# Patient Record
(demographics unavailable — no encounter records)

---

## 2024-10-28 NOTE — HEALTH RISK ASSESSMENT
[Good] : ~his/her~  mood as  good [Yes] : Yes [Monthly or less (1 pt)] : Monthly or less (1 point) [No falls in past year] : Patient reported no falls in the past year [0] : 2) Feeling down, depressed, or hopeless: Not at all (0) [PHQ-2 Negative - No further assessment needed] : PHQ-2 Negative - No further assessment needed [HIV test declined] : HIV test declined [Hepatitis C test declined] : Hepatitis C test declined [Fully functional (bathing, dressing, toileting, transferring, walking, feeding)] : Fully functional (bathing, dressing, toileting, transferring, walking, feeding) [Fully functional (using the telephone, shopping, preparing meals, housekeeping, doing laundry, using] : Fully functional and needs no help or supervision to perform IADLs (using the telephone, shopping, preparing meals, housekeeping, doing laundry, using transportation, managing medications and managing finances) [With Patient/Caregiver] : , with patient/caregiver [Never] : Never [[id number]] : 0 [MammogramDate] : never [MammogramComments] : ref [AdvancecareDate] : 10/28/2024

## 2024-10-28 NOTE — HISTORY OF PRESENT ILLNESS
[FreeTextEntry1] : HERE FOR Comprehensive annual examination  [de-identified] : diet- healthy exercise- running- 1 hr 2-3 times/ wk.  Has been in good overall health since his last CPE.

## 2024-10-28 NOTE — ASSESSMENT
[FreeTextEntry1] : While JAIDEN was in my office today, I administered a flu vaccine to her. I wanted you to be aware in order to update your records.  MEDICATION RECONCILIATION DONE *Immunizations COVID -19 TOTAL VACCINE 3    Influenza  10/28/2024 Pneumococcal - N/A TDAP -2020 GARDASIL :  3 /3 shingles vaccine N/A EKG done for HCM at this office today;  NSR no acute ST-T changes Lab ( venipuncture ) done today at this office Preventive medicine discussed - including importance of lifestyle modification - with incorporation of healthy diet,  recommended Diet low fat diet Depression screening WITH the Patient Health Questionnaire-2 (PHQ-2), THE RESULT WAS NEGATIVE. The benefits of adequate calcium intake and routine daily cardiovascular exercise were reviewed with the patient.  The patient was informed regarding the benefits of a YEARLY screening FOR SKIN CANCER -Recommended following up with dermatology for annual skin surveillance. -Recommended following up with GYN for annual surveillance. Recommended following up with dental, opthalmology  The importance of safer-sex was discussed with the patient. DISCUSSED PRECAUTIONS AGAINST COVID19, INCLUDING MASK WEARING, SOCIAL DISTANCING AND HAND WASHING. -Follow up in 1 year for CPE / annual exam or PRN. All questions and concerns were discussed.

## 2025-03-13 NOTE — PHYSICAL EXAM
[de-identified] : Constitutional:  37 year old female, alert and oriented, cooperative, in no acute distress.  HEENT  NC/AT.  Appearance: symmetric  Neck/Back Straight without deformity or instability.  Good ROM.  Chest/Respiratory  Respiratory effort: no intercostal retractions or use of accessory muscles. Nonlabored Breathing  Skin  On inspection, warm and dry without rashes or lesions.  Mental Status:  Judgment, insight: intact Orientation: oriented to time, place, and person  Neurological: Sensory and Motor are grossly intact throughout  Left Knee  Inspection:     Skin intact, no rashes or lesions     No Effusion     Non-tender to palpation over tibial tubercle, patella, medial and lateral joint line, and pes insertion.     Mild tenderness over the lateral hamstring insertion  Range of Motion: 	Extension - 0 degrees 	Flexion - 120 degrees 	Extensor lag: None  Stability:      Demonstrates no Varus or Valgus instability      Negative Anterior or Posterior drawer.      Negative Lachman's  Patella: stable, tracks well.   Neurologic Exam     Motor intact including 5/5 Extensor Hallucis Longus, 5/5 Flexor Hallucis Longus, 5/5 Tibialis Anterior and 5/5 Gastrocnemius     Sensation Intact to Light Touch including Saphenous, Sural, Superficial Peroneal, Deep Peroneal, Tibial nerve distributions  Vascular Exam     Foot is warm and well perfused with 2+ Dorsalis Pedis Pulse   No pain with range of motion of the bilateral hips or right knee. No lumbar paraspinal muscle tenderness. [de-identified] : XRay:  XRays of the Left Knee (4 Views) taken in the office today and discussed with the patient. XRays demonstrate no obvious fracture or dislocation. There is no significant evidence of osteoarthritis or osteophyte formation. (my personal interpretation).

## 2025-03-13 NOTE — DISCUSSION/SUMMARY
[de-identified] : Kati Phillip is a 37-year-old female who presents to the office for evaluation of her left knee pain.  X-rays showed no obvious fractures or dislocations.  Examination showed good left knee range of motion.  There was some tenderness over the hamstring tendon.  Discussed with the patient the examination and imaging findings.  Discussed with patient that her pain is possibly secondary to hamstring tendinitis.  Discussed with patient the management of her pain at this time, including home exercises, rest, ice, and elevation.  Patient will rest, ice, and elevate the knee.  Discussed home exercises and stretching.  Patient will take over-the-counter anti-inflammatories as needed for her pain control.  Patient will follow-up in 1 month for reevaluation and management.  Patient understanding and in agreement with the plan.  All questions answered.  Plan: -Home exercises/stretching -Over-the-counter anti-inflammatories as needed for pain control -Rest, ice, and elevation of the knee -Follow-up in 1 month for reevaluation and management

## 2025-03-13 NOTE — DISCUSSION/SUMMARY
[de-identified] : Kati Phillip is a 37-year-old female who presents to the office for evaluation of her left knee pain.  X-rays showed no obvious fractures or dislocations.  Examination showed good left knee range of motion.  There was some tenderness over the hamstring tendon.  Discussed with the patient the examination and imaging findings.  Discussed with patient that her pain is possibly secondary to hamstring tendinitis.  Discussed with patient the management of her pain at this time, including home exercises, rest, ice, and elevation.  Patient will rest, ice, and elevate the knee.  Discussed home exercises and stretching.  Patient will take over-the-counter anti-inflammatories as needed for her pain control.  Patient will follow-up in 1 month for reevaluation and management.  Patient understanding and in agreement with the plan.  All questions answered.  Plan: -Home exercises/stretching -Over-the-counter anti-inflammatories as needed for pain control -Rest, ice, and elevation of the knee -Follow-up in 1 month for reevaluation and management

## 2025-03-13 NOTE — HISTORY OF PRESENT ILLNESS
[de-identified] : Kati Phillip is a 37-year-old female who presents to the office for evaluation of her left knee pain.  2 weeks ago, patient was skiing when changing in and out of skis.  She then started to feel some pain when pressing down on her skis.  Pain was located over the posterolateral knee.  Patient has tried compression sleeves.  She has tried ice and elevation.  She has tried Aleve.  She has not tried physical therapy.  There was no fall or pop on the knee.  History: None

## 2025-03-13 NOTE — PHYSICAL EXAM
[de-identified] : Constitutional:  37 year old female, alert and oriented, cooperative, in no acute distress.  HEENT  NC/AT.  Appearance: symmetric  Neck/Back Straight without deformity or instability.  Good ROM.  Chest/Respiratory  Respiratory effort: no intercostal retractions or use of accessory muscles. Nonlabored Breathing  Skin  On inspection, warm and dry without rashes or lesions.  Mental Status:  Judgment, insight: intact Orientation: oriented to time, place, and person  Neurological: Sensory and Motor are grossly intact throughout  Left Knee  Inspection:     Skin intact, no rashes or lesions     No Effusion     Non-tender to palpation over tibial tubercle, patella, medial and lateral joint line, and pes insertion.     Mild tenderness over the lateral hamstring insertion  Range of Motion: 	Extension - 0 degrees 	Flexion - 120 degrees 	Extensor lag: None  Stability:      Demonstrates no Varus or Valgus instability      Negative Anterior or Posterior drawer.      Negative Lachman's  Patella: stable, tracks well.   Neurologic Exam     Motor intact including 5/5 Extensor Hallucis Longus, 5/5 Flexor Hallucis Longus, 5/5 Tibialis Anterior and 5/5 Gastrocnemius     Sensation Intact to Light Touch including Saphenous, Sural, Superficial Peroneal, Deep Peroneal, Tibial nerve distributions  Vascular Exam     Foot is warm and well perfused with 2+ Dorsalis Pedis Pulse   No pain with range of motion of the bilateral hips or right knee. No lumbar paraspinal muscle tenderness. [de-identified] : XRay:  XRays of the Left Knee (4 Views) taken in the office today and discussed with the patient. XRays demonstrate no obvious fracture or dislocation. There is no significant evidence of osteoarthritis or osteophyte formation. (my personal interpretation).

## 2025-03-13 NOTE — HISTORY OF PRESENT ILLNESS
[de-identified] : Kati Phillip is a 37-year-old female who presents to the office for evaluation of her left knee pain.  2 weeks ago, patient was skiing when changing in and out of skis.  She then started to feel some pain when pressing down on her skis.  Pain was located over the posterolateral knee.  Patient has tried compression sleeves.  She has tried ice and elevation.  She has tried Aleve.  She has not tried physical therapy.  There was no fall or pop on the knee.  History: None

## 2025-03-17 NOTE — DISCUSSION/SUMMARY
[de-identified] : The patient has sustained a sprain to her left ankle.  I have discussed the pathology, natural history and treatment options with her.  She will use the Aircast.  She will keep her ankle elevated and apply ice.  She will use crutches.  She will be reevaluated in 2 weeks.

## 2025-03-17 NOTE — HISTORY OF PRESENT ILLNESS
[de-identified] : 37-year-old female presents for evaluation of left ankle injury that occurred on 3/15/25. She slipped and fell on the sidewalk, rolled her ankle and fell. She was unable to put weight on her leg afterwards. She went to urgent care the following day, had x-rays of the ankle which were negative for fracture. She was placed in an air cast, Darco shoe and given crutches. She complains of intermittent pain at the Achilles and swelling over the medial and lateral ankle worse with putting weight on the foot. She has been icing, elevating and taking Aleve which has been helpful. Denies numbness or tingling. No prior injuries.

## 2025-03-17 NOTE — PHYSICAL EXAM
[Slightly Antalgic] : slightly antalgic [Crutches] : ambulates with crutches [Normal] : Alert and in no acute distress [Poor Appearance] : well-appearing [Acute Distress] : not in acute distress [Obese] : not obese [de-identified] : The patient has no respiratory distress. Mood and affect are normal. The patient is alert and oriented to person, place and time. The patient has no pain with motion of the hips or the knees. The left ankle is tender and swollen in the area of the ATFL. There is no medial tenderness, swelling or deformity. The ankle is stable. The Achilles tendon is intact and nontender. Ankle dorsiflexion is 5 and plantar flexion 15. The skin of the ankle, midfoot and forefoot are intact. The calf is soft.  There is no lymphedema. [de-identified] : AP, lateral and mortise x-rays of the left ankle brought in by the patient are reviewed.  There are no fractures or dislocations.